# Patient Record
Sex: MALE | Race: WHITE
[De-identification: names, ages, dates, MRNs, and addresses within clinical notes are randomized per-mention and may not be internally consistent; named-entity substitution may affect disease eponyms.]

---

## 2021-05-04 ENCOUNTER — HOSPITAL ENCOUNTER (OUTPATIENT)
Dept: HOSPITAL 96 - M.PUL | Age: 61
End: 2021-05-04
Attending: ORTHOPAEDIC SURGERY
Payer: COMMERCIAL

## 2021-05-04 DIAGNOSIS — R06.02: ICD-10-CM

## 2021-05-04 DIAGNOSIS — D86.0: Primary | ICD-10-CM

## 2021-05-24 NOTE — PF
[place] [address]                    PULMONARY FUNCTION REPORT     
_______________________________________________________________________________
 
Name:       VIVI QUINONES                Room:                      REG CLI 
M.R.#:  [id number]      Account #:      [id number]  
Admission:  05/04/21     Attend Phys:    Nahum Servin MD   
Discharge:               Date of Birth:  [birth year]  
         Report #: [id number]
                                                                     [id number]
_______________________________________________________________________________
THIS REPORT FOR:  
 
cc:  Stephon Castano MD, Richard K. MD Pervez, Adeel MD                                                   ~
DOC #: [id number]
Juan Carlos Yepez MD
DATE OF VISIT: 05/04/2021
 
The FEV1/FVC ratio is 77% with an FVC normal at 87%.  The FEV1 is also normal at
88%.  The FEF 25-75 is also normal at 92%.  After the administration of a 
bronchodilator, there is no significant increase in any of these values.  The 
patient's post-bronchodilator FEV1 is 3.62 liters.  Only a spirometry was 
performed.
 
IMPRESSION:  Normal spirometry.
 
MD MARY LOU Curry/DAVE/DIAZ
 
D: 05/23/2021 09:46 PM
T: 05/24/2021 02:20 AM
 
 
 
 
 
 
 
 
 
 
 
 
 
 
 
 
 
 
 
 
 
 
<ELECTRONICALLY SIGNED>
                                        By:  Juan Carlos Yepez MD              
05/24/21     0818
D: 05/23/21 2046_______________________________________
T: 05/24/21 0120Juan Carlos Yepez MD                 /nt